# Patient Record
Sex: MALE | NOT HISPANIC OR LATINO | Employment: OTHER | URBAN - METROPOLITAN AREA
[De-identification: names, ages, dates, MRNs, and addresses within clinical notes are randomized per-mention and may not be internally consistent; named-entity substitution may affect disease eponyms.]

---

## 2017-01-03 ENCOUNTER — CONSULT (OUTPATIENT)
Dept: CARDIOLOGY | Facility: CLINIC | Age: 82
End: 2017-01-03

## 2017-01-03 VITALS
BODY MASS INDEX: 26.56 KG/M2 | WEIGHT: 207 LBS | DIASTOLIC BLOOD PRESSURE: 92 MMHG | HEIGHT: 74 IN | HEART RATE: 77 BPM | SYSTOLIC BLOOD PRESSURE: 174 MMHG

## 2017-01-03 DIAGNOSIS — I25.10 CORONARY ARTERY DISEASE INVOLVING NATIVE CORONARY ARTERY OF NATIVE HEART WITHOUT ANGINA PECTORIS: ICD-10-CM

## 2017-01-03 DIAGNOSIS — I44.0 FIRST DEGREE AV BLOCK: ICD-10-CM

## 2017-01-03 DIAGNOSIS — I44.1 SECOND DEGREE AV BLOCK: ICD-10-CM

## 2017-01-03 DIAGNOSIS — J45.31 MILD PERSISTENT ASTHMA WITH ACUTE EXACERBATION: ICD-10-CM

## 2017-01-03 DIAGNOSIS — I49.3 PVC'S (PREMATURE VENTRICULAR CONTRACTIONS): Primary | ICD-10-CM

## 2017-01-03 DIAGNOSIS — I10 ESSENTIAL HYPERTENSION: ICD-10-CM

## 2017-01-03 PROBLEM — E78.5 HLD (HYPERLIPIDEMIA): Status: ACTIVE | Noted: 2017-01-03

## 2017-01-03 PROBLEM — J45.909 ASTHMA: Status: ACTIVE | Noted: 2017-01-03

## 2017-01-03 PROBLEM — Z98.890 H/O SINUS SURGERY: Status: ACTIVE | Noted: 2017-01-03

## 2017-01-03 PROBLEM — E87.6 HYPOKALEMIA: Status: ACTIVE | Noted: 2017-01-03

## 2017-01-03 PROBLEM — K21.9 GASTROESOPHAGEAL REFLUX DISEASE WITHOUT ESOPHAGITIS: Status: ACTIVE | Noted: 2017-01-03

## 2017-01-03 PROBLEM — Z85.46 H/O PROSTATE CANCER: Status: ACTIVE | Noted: 2017-01-03

## 2017-01-03 PROCEDURE — 99203 OFFICE O/P NEW LOW 30 MIN: CPT | Performed by: INTERNAL MEDICINE

## 2017-01-03 PROCEDURE — 93000 ELECTROCARDIOGRAM COMPLETE: CPT | Performed by: INTERNAL MEDICINE

## 2017-01-03 RX ORDER — AMLODIPINE BESYLATE 5 MG/1
5 TABLET ORAL DAILY
COMMUNITY

## 2017-01-03 RX ORDER — RANITIDINE 150 MG/1
300 TABLET ORAL DAILY
COMMUNITY

## 2017-01-03 RX ORDER — MONTELUKAST SODIUM 10 MG/1
10 TABLET ORAL NIGHTLY
COMMUNITY

## 2017-01-03 RX ORDER — ATORVASTATIN CALCIUM 40 MG/1
40 TABLET, FILM COATED ORAL DAILY
COMMUNITY

## 2017-01-03 RX ORDER — LORATADINE 10 MG/1
CAPSULE, LIQUID FILLED ORAL DAILY
COMMUNITY

## 2017-01-03 RX ORDER — BISOPROLOL FUMARATE AND HYDROCHLOROTHIAZIDE 5; 6.25 MG/1; MG/1
1 TABLET ORAL DAILY
COMMUNITY

## 2017-01-03 RX ORDER — CLOPIDOGREL BISULFATE 75 MG/1
75 TABLET ORAL DAILY
COMMUNITY

## 2017-01-03 RX ORDER — LANSOPRAZOLE 30 MG/1
30 CAPSULE, DELAYED RELEASE ORAL DAILY
COMMUNITY

## 2017-01-03 RX ORDER — CHLORTHALIDONE 25 MG/1
25 TABLET ORAL DAILY
COMMUNITY

## 2017-01-03 RX ORDER — POTASSIUM CHLORIDE 20 MEQ/1
20 TABLET, EXTENDED RELEASE ORAL DAILY
COMMUNITY

## 2017-01-03 NOTE — LETTER
January 3, 2017     Arturo Cedillo MD  [address] [facility]  [address]  [facility] [address]    Patient: Rogelio Shaikh   YOB: 1935   Date of Visit: 1/3/2017       Dear Dr. Mamadou MD:    Thank you for referring Rogelio Shaikh to me for evaluation. Below are the relevant portions of my assessment and plan of care.    If you have questions, please do not hesitate to call me. I look forward to following Rogelio along with you.         Sincerely,        Deyvi Stanton MD        CC: [facility], attn Dr. Yobany Stanton MD  1/3/2017  2:37 PM  Sign at close encounter  PCP:Dr. Arturo Cedillo  REFERRING MD:Dr. Delacruz      Chief Complaint: PVC's  Referred for consult:  Regarding asymptomatic PVC's  Onset: October  Duration: Days  Location: Ventricles  Quality: Intermittent  Frequency: Days  Severity: Mild  Timing: Random  Symptoms No symptoms with PVC's*  Associated Factors/Procedures to treat: Intolerance   Medication(s) used: Intolerance to mexiletine    No problems updated.    No past medical history on file.     No past surgical history on file.       Family History: no early family history of CAD    Social History     Social History   • Marital status:      Spouse name: N/A   • Number of children: N/A   • Years of education: N/A     Social History Main Topics   • Smoking status: Never Smoker   • Smokeless tobacco: None   • Alcohol use No   • Drug use: No   • Sexual activity: Defer     Other Topics Concern   • None     Social History Narrative   • None          Current Outpatient Prescriptions:   •  Albuterol (VENTOLIN IN), Inhale As Needed., Disp: , Rfl:   •  amLODIPine (NORVASC) 5 MG tablet, Take 5 mg by mouth Daily., Disp: , Rfl:   •  aspirin 81 MG tablet, Take 81 mg by mouth Daily., Disp: , Rfl:   •  atorvastatin (LIPITOR) 40 MG tablet, Take 40 mg by mouth Daily., Disp: , Rfl:   •  bisoprolol-hydrochlorothiazide (ZIAC) 5-6.25 MG per tablet, Take 1 tablet by  "mouth Daily., Disp: , Rfl:   •  chlorthalidone (HYGROTON) 25 MG tablet, Take 25 mg by mouth Daily., Disp: , Rfl:   •  clopidogrel (PLAVIX) 75 MG tablet, Take 75 mg by mouth Daily., Disp: , Rfl:   •  fluticasone (VERAMYST) 27.5 MCG/SPRAY nasal spray, 2 sprays into each nostril 2 (Two) Times a Day., Disp: , Rfl:   •  Fluticasone-Salmeterol (ADVAIR HFA IN), Inhale 2 (Two) Times a Day., Disp: , Rfl:   •  GuaiFENesin (MUCINEX PO), Take  by mouth 2 (Two) Times a Day., Disp: , Rfl:   •  lansoprazole (PREVACID) 30 MG capsule, Take 30 mg by mouth Daily., Disp: , Rfl:   •  Loratadine 10 MG capsule, Take  by mouth Daily., Disp: , Rfl:   •  magnesium oxide (MAGOX) 400 (241.3 MG) MG tablet tablet, Take 400 mg by mouth Daily., Disp: , Rfl:   •  montelukast (SINGULAIR) 10 MG tablet, Take 10 mg by mouth Every Night., Disp: , Rfl:   •  potassium chloride (K-DUR,KLOR-CON) 20 MEQ CR tablet, Take 20 mEq by mouth Daily., Disp: , Rfl:   •  raNITIdine (ZANTAC) 150 MG tablet, Take 300 mg by mouth Daily., Disp: , Rfl:    No Known Allergies     Review of Symptoms: Completely negative save the above pertinent findings    Physical Exam:    Visit Vitals   • /92 (BP Location: Left arm, Patient Position: Sitting)   • Pulse 77   • Ht 74\" (188 cm)   • Wt 207 lb (93.9 kg)   • BMI 26.58 kg/m2        General: Appears well groomed, well developed, appearing stated age, in no acute distress or discomfort  HEENT: Normocephalic, atraumatic, PERRLA, EOMI, sclera anicteric, oral mucosa moist, no erythema or exudates  Neck: Supple, non-tender, no JVD, no thyroid nodularity or thyromegaly, no bruits, pules normal  Lungs; CTA, no wheezing, equal air entry bilaterally, resonant to percussion  Cor: RRR, physiologic S1/S2, no rubs, gallops, murmurs thrills, rubs or clicks  Abd: Soft, non-tender, no organomegaly or bruits  Ext: Non-tender, no edema ,no cycanosis,  femoral/pedal pulses normal  Neuro: AAO X 3 CN II-XII grossly intact and equal " bilaterally  Skin: pink, warm, dry  Musculoskeletal: Normal range of motion and no chika abnormalities        ECG 12 Lead  Date/Time: 1/3/2017 2:20 PM  Performed by: YESENIA GRIDER  Authorized by: YESENIA GRIDER   Rate: normal  Conduction: conduction normal  Conduction: 1st degree  ST Segments: ST segments normal  T Waves: T waves normal  QRS axis: normal  Other: no other findings  Clinical impression: normal ECG             Diagnosis Plan   1. PVC's (premature ventricular contractions), asymptomatic Asymptomatic PVCs do not require anti-arrhythmic drug nor consideration of ablation in setting of them not decreasing EF. He has been managed appropriately by normalizing potassium       2. First degree AV block     3. Coronary artery disease involving native coronary artery of native heart without angina pectoris     4. Essential hypertension     5. Mild persistent asthma with acute exacerbation     6. Second degree AV block  He has been counseled as to the symptoms that might occur if he has this without an iatrogenic cause, and thus need for PPM    on mexiletine      Will Navdeep KOROMA as a Scribe for Dr. Grider    The patient's old records including ambulatory rhythm recordings (ECGs, Holter/event monitor) and ultrasound (ECHO) were reviewed and discussed.      I have interviewed/examined patient, reviewed note, exam, labs, have addended, if necessary, and agree.

## 2017-01-03 NOTE — PROGRESS NOTES
PCP:Dr. Arturo Cedillo  REFERRING MD:Dr. Delacruz      Chief Complaint: PVC's  Referred for consult:  Regarding asymptomatic PVC's  Onset: October  Duration: Days  Location: Ventricles  Quality: Intermittent  Frequency: Days  Severity: Mild  Timing: Random  Symptoms No symptoms with PVC's*  Associated Factors/Procedures to treat: Intolerance   Medication(s) used: Intolerance to mexiletine    No problems updated.    No past medical history on file.     No past surgical history on file.       Family History: no early family history of CAD    Social History     Social History   • Marital status:      Spouse name: N/A   • Number of children: N/A   • Years of education: N/A     Social History Main Topics   • Smoking status: Never Smoker   • Smokeless tobacco: None   • Alcohol use No   • Drug use: No   • Sexual activity: Defer     Other Topics Concern   • None     Social History Narrative   • None          Current Outpatient Prescriptions:   •  Albuterol (VENTOLIN IN), Inhale As Needed., Disp: , Rfl:   •  amLODIPine (NORVASC) 5 MG tablet, Take 5 mg by mouth Daily., Disp: , Rfl:   •  aspirin 81 MG tablet, Take 81 mg by mouth Daily., Disp: , Rfl:   •  atorvastatin (LIPITOR) 40 MG tablet, Take 40 mg by mouth Daily., Disp: , Rfl:   •  bisoprolol-hydrochlorothiazide (ZIAC) 5-6.25 MG per tablet, Take 1 tablet by mouth Daily., Disp: , Rfl:   •  chlorthalidone (HYGROTON) 25 MG tablet, Take 25 mg by mouth Daily., Disp: , Rfl:   •  clopidogrel (PLAVIX) 75 MG tablet, Take 75 mg by mouth Daily., Disp: , Rfl:   •  fluticasone (VERAMYST) 27.5 MCG/SPRAY nasal spray, 2 sprays into each nostril 2 (Two) Times a Day., Disp: , Rfl:   •  Fluticasone-Salmeterol (ADVAIR HFA IN), Inhale 2 (Two) Times a Day., Disp: , Rfl:   •  GuaiFENesin (MUCINEX PO), Take  by mouth 2 (Two) Times a Day., Disp: , Rfl:   •  lansoprazole (PREVACID) 30 MG capsule, Take 30 mg by mouth Daily., Disp: , Rfl:   •  Loratadine 10 MG capsule, Take  by mouth Daily.,  "Disp: , Rfl:   •  magnesium oxide (MAGOX) 400 (241.3 MG) MG tablet tablet, Take 400 mg by mouth Daily., Disp: , Rfl:   •  montelukast (SINGULAIR) 10 MG tablet, Take 10 mg by mouth Every Night., Disp: , Rfl:   •  potassium chloride (K-DUR,KLOR-CON) 20 MEQ CR tablet, Take 20 mEq by mouth Daily., Disp: , Rfl:   •  raNITIdine (ZANTAC) 150 MG tablet, Take 300 mg by mouth Daily., Disp: , Rfl:    No Known Allergies     Review of Symptoms: Completely negative save the above pertinent findings    Physical Exam:    Visit Vitals   • /92 (BP Location: Left arm, Patient Position: Sitting)   • Pulse 77   • Ht 74\" (188 cm)   • Wt 207 lb (93.9 kg)   • BMI 26.58 kg/m2        General: Appears well groomed, well developed, appearing stated age, in no acute distress or discomfort  HEENT: Normocephalic, atraumatic, PERRLA, EOMI, sclera anicteric, oral mucosa moist, no erythema or exudates  Neck: Supple, non-tender, no JVD, no thyroid nodularity or thyromegaly, no bruits, pules normal  Lungs; CTA, no wheezing, equal air entry bilaterally, resonant to percussion  Cor: RRR, physiologic S1/S2, no rubs, gallops, murmurs thrills, rubs or clicks  Abd: Soft, non-tender, no organomegaly or bruits  Ext: Non-tender, no edema ,no cycanosis,  femoral/pedal pulses normal  Neuro: AAO X 3 CN II-XII grossly intact and equal bilaterally  Skin: pink, warm, dry  Musculoskeletal: Normal range of motion and no chika abnormalities        ECG 12 Lead  Date/Time: 1/3/2017 2:20 PM  Performed by: YESENIA GRIDER  Authorized by: YESENIA GRIDER   Rate: normal  Conduction: conduction normal  Conduction: 1st degree  ST Segments: ST segments normal  T Waves: T waves normal  QRS axis: normal  Other: no other findings  Clinical impression: normal ECG             Diagnosis Plan   1. PVC's (premature ventricular contractions), asymptomatic Asymptomatic PVCs do not require anti-arrhythmic drug nor consideration of ablation in setting of them not decreasing EF. " He has been managed appropriately by normalizing potassium       2. First degree AV block     3. Coronary artery disease involving native coronary artery of native heart without angina pectoris     4. Essential hypertension     5. Mild persistent asthma with acute exacerbation     6. Second degree AV block  He has been counseled as to the symptoms that might occur if he has this without an iatrogenic cause, and thus need for PPM    on mexiletine      Will Navdeep KOROMA as a Scribe for Dr. Stanton    The patient's old records including ambulatory rhythm recordings (ECGs, Holter/event monitor) and ultrasound (ECHO) were reviewed and discussed.      I have interviewed/examined patient, reviewed note, exam, labs, have addended, if necessary, and agree.